# Patient Record
Sex: FEMALE | Race: BLACK OR AFRICAN AMERICAN | NOT HISPANIC OR LATINO | Employment: FULL TIME | URBAN - METROPOLITAN AREA
[De-identification: names, ages, dates, MRNs, and addresses within clinical notes are randomized per-mention and may not be internally consistent; named-entity substitution may affect disease eponyms.]

---

## 2019-02-12 ENCOUNTER — HISTORICAL (OUTPATIENT)
Dept: RADIOLOGY | Facility: HOSPITAL | Age: 39
End: 2019-02-12

## 2020-02-28 ENCOUNTER — HISTORICAL (OUTPATIENT)
Dept: ADMINISTRATIVE | Facility: HOSPITAL | Age: 40
End: 2020-02-28

## 2020-02-28 LAB
ABS NEUT (OLG): 3.87 X10(3)/MCL (ref 2.1–9.2)
BASOPHILS # BLD AUTO: 0 X10(3)/MCL (ref 0–0.2)
BASOPHILS NFR BLD AUTO: 0 %
BUN SERPL-MCNC: 11 MG/DL (ref 7–18)
CALCIUM SERPL-MCNC: 9.1 MG/DL (ref 8.5–10.1)
CHLORIDE SERPL-SCNC: 108 MMOL/L (ref 98–107)
CO2 SERPL-SCNC: 28 MMOL/L (ref 21–32)
CREAT SERPL-MCNC: 0.6 MG/DL (ref 0.6–1.3)
CREAT/UREA NIT SERPL: 18
EOSINOPHIL # BLD AUTO: 0.1 X10(3)/MCL (ref 0–0.9)
EOSINOPHIL NFR BLD AUTO: 1 %
ERYTHROCYTE [DISTWIDTH] IN BLOOD BY AUTOMATED COUNT: 15.6 % (ref 11.5–14.5)
GLUCOSE SERPL-MCNC: 96 MG/DL (ref 74–106)
HCT VFR BLD AUTO: 36.1 % (ref 35–46)
HGB BLD-MCNC: 10.9 GM/DL (ref 12–16)
IMM GRANULOCYTES # BLD AUTO: 0.02 10*3/UL
IMM GRANULOCYTES NFR BLD AUTO: 0 %
LYMPHOCYTES # BLD AUTO: 1.9 X10(3)/MCL (ref 0.6–4.6)
LYMPHOCYTES NFR BLD AUTO: 30 %
MAGNESIUM SERPL-MCNC: 2.1 MG/DL (ref 1.6–2.6)
MCH RBC QN AUTO: 28.4 PG (ref 26–34)
MCHC RBC AUTO-ENTMCNC: 30.2 GM/DL (ref 31–37)
MCV RBC AUTO: 94 FL (ref 80–100)
MONOCYTES # BLD AUTO: 0.5 X10(3)/MCL (ref 0.1–1.3)
MONOCYTES NFR BLD AUTO: 7 %
NEUTROPHILS # BLD AUTO: 3.87 X10(3)/MCL (ref 2.1–9.2)
NEUTROPHILS NFR BLD AUTO: 61 %
PLATELET # BLD AUTO: 311 X10(3)/MCL (ref 130–400)
PMV BLD AUTO: 9.1 FL (ref 7.4–10.4)
POTASSIUM SERPL-SCNC: 4.1 MMOL/L (ref 3.5–5.1)
RBC # BLD AUTO: 3.84 X10(6)/MCL (ref 4–5.2)
SODIUM SERPL-SCNC: 140 MMOL/L (ref 136–145)
WBC # SPEC AUTO: 6.4 X10(3)/MCL (ref 4.5–11)

## 2020-04-10 ENCOUNTER — HISTORICAL (OUTPATIENT)
Dept: ADMINISTRATIVE | Facility: HOSPITAL | Age: 40
End: 2020-04-10

## 2021-10-26 ENCOUNTER — HISTORICAL (OUTPATIENT)
Dept: ADMINISTRATIVE | Facility: HOSPITAL | Age: 41
End: 2021-10-26

## 2021-10-26 LAB
ABS NEUT (OLG): 5.32 X10(3)/MCL (ref 2.1–9.2)
ALBUMIN SERPL-MCNC: 4.1 GM/DL (ref 3.5–5)
ALBUMIN/GLOB SERPL: 1.1 RATIO (ref 1.1–2)
ALP SERPL-CCNC: 67 UNIT/L (ref 40–150)
ALT SERPL-CCNC: 21 UNIT/L (ref 0–55)
AST SERPL-CCNC: 22 UNIT/L (ref 5–34)
BASOPHILS # BLD AUTO: 0 X10(3)/MCL (ref 0–0.2)
BASOPHILS NFR BLD AUTO: 0 %
BILIRUB SERPL-MCNC: 0.6 MG/DL
BILIRUBIN DIRECT+TOT PNL SERPL-MCNC: 0.2 MG/DL (ref 0–0.5)
BILIRUBIN DIRECT+TOT PNL SERPL-MCNC: 0.4 MG/DL (ref 0–0.8)
BUN SERPL-MCNC: 13.2 MG/DL (ref 7–18.7)
CALCIUM SERPL-MCNC: 9.7 MG/DL (ref 8.7–10.5)
CHLORIDE SERPL-SCNC: 106 MMOL/L (ref 98–107)
CHOLEST SERPL-MCNC: 223 MG/DL
CHOLEST/HDLC SERPL: 5 {RATIO} (ref 0–5)
CO2 SERPL-SCNC: 25 MMOL/L (ref 22–29)
CREAT SERPL-MCNC: 0.72 MG/DL (ref 0.55–1.02)
DEPRECATED CALCIDIOL+CALCIFEROL SERPL-MC: 9 NG/ML (ref 30–80)
EOSINOPHIL # BLD AUTO: 0 X10(3)/MCL (ref 0–0.9)
EOSINOPHIL NFR BLD AUTO: 1 %
ERYTHROCYTE [DISTWIDTH] IN BLOOD BY AUTOMATED COUNT: 16 % (ref 11.5–14.5)
EST. AVERAGE GLUCOSE BLD GHB EST-MCNC: 99.7 MG/DL
FERRITIN SERPL-MCNC: 10.21 NG/ML (ref 4.63–204)
GLOBULIN SER-MCNC: 3.9 GM/DL (ref 2.4–3.5)
GLUCOSE SERPL-MCNC: 86 MG/DL (ref 74–100)
HAV IGM SERPL QL IA: NONREACTIVE
HBA1C MFR BLD: 5.1 %
HBV CORE IGM SERPL QL IA: NONREACTIVE
HBV SURFACE AG SERPL QL IA: NONREACTIVE
HCT VFR BLD AUTO: 36.4 % (ref 35–46)
HCV AB SERPL QL IA: NONREACTIVE
HDLC SERPL-MCNC: 48 MG/DL (ref 35–60)
HGB BLD-MCNC: 11.2 GM/DL (ref 12–16)
HIV 1+2 AB+HIV1 P24 AG SERPL QL IA: NONREACTIVE
IMM GRANULOCYTES # BLD AUTO: 0.01 10*3/UL
IMM GRANULOCYTES NFR BLD AUTO: 0 %
IRON SATN MFR SERPL: 16 % (ref 20–50)
IRON SERPL-MCNC: 66 UG/DL (ref 50–170)
LDLC SERPL CALC-MCNC: 151 MG/DL (ref 50–140)
LYMPHOCYTES # BLD AUTO: 2.1 X10(3)/MCL (ref 0.6–4.6)
LYMPHOCYTES NFR BLD AUTO: 26 %
MCH RBC QN AUTO: 27.2 PG (ref 26–34)
MCHC RBC AUTO-ENTMCNC: 30.8 GM/DL (ref 31–37)
MCV RBC AUTO: 88.3 FL (ref 80–100)
MONOCYTES # BLD AUTO: 0.6 X10(3)/MCL (ref 0.1–1.3)
MONOCYTES NFR BLD AUTO: 7 %
NEUTROPHILS # BLD AUTO: 5.32 X10(3)/MCL (ref 2.1–9.2)
NEUTROPHILS NFR BLD AUTO: 66 %
NRBC BLD AUTO-RTO: 0 % (ref 0–0.2)
PLATELET # BLD AUTO: 343 X10(3)/MCL (ref 130–400)
PMV BLD AUTO: 9 FL (ref 7.4–10.4)
POTASSIUM SERPL-SCNC: 4 MMOL/L (ref 3.5–5.1)
PROT SERPL-MCNC: 8 GM/DL (ref 6.4–8.3)
RBC # BLD AUTO: 4.12 X10(6)/MCL (ref 4–5.2)
SODIUM SERPL-SCNC: 137 MMOL/L (ref 136–145)
TIBC SERPL-MCNC: 343 UG/DL (ref 70–310)
TIBC SERPL-MCNC: 409 UG/DL (ref 250–450)
TRANSFERRIN SERPL-MCNC: 378 MG/DL (ref 180–382)
TRIGL SERPL-MCNC: 118 MG/DL (ref 37–140)
TSH SERPL-ACNC: 1.29 UIU/ML (ref 0.35–4.94)
VLDLC SERPL CALC-MCNC: 24 MG/DL
WBC # SPEC AUTO: 8.1 X10(3)/MCL (ref 4.5–11)

## 2022-04-11 ENCOUNTER — HISTORICAL (OUTPATIENT)
Dept: ADMINISTRATIVE | Facility: HOSPITAL | Age: 42
End: 2022-04-11

## 2022-04-24 VITALS
OXYGEN SATURATION: 99 % | DIASTOLIC BLOOD PRESSURE: 87 MMHG | BODY MASS INDEX: 37.43 KG/M2 | HEIGHT: 65 IN | WEIGHT: 224.63 LBS | SYSTOLIC BLOOD PRESSURE: 131 MMHG

## 2022-05-04 NOTE — HISTORICAL [facility]ER
This is a historical note converted from [employer]. Formatting and pictures may have been removed.  Please reference [employer] for original formatting and attached multimedia. Chief Complaint  right lower leg swelling, redness, pain (rated 3/10 now; pain increased ?wehn walking) X 3 days  History of Present Illness  40-year-old female presents during COVID 19?health emergency?complaining?of 3 days of right lateral lower leg pain, no precipitating event, no fever or chills.? Denies chest pain or shortness of breath  Review of Systems  Constitutional: negative except as stated in HPI  Eye: negative except as stated in HPI  ENT: negative except as stated in HPI  Respiratory: negative except as stated in HPI  Cardiovascular: negative except as stated in HPI  Gastrointestinal: negative except as stated in HPI  Genitourinary: negative except as stated in HPI  Physical Exam  Vitals & Measurements  T:?37.2? ?C (Oral)? HR:?79(Peripheral)? RR:?18? BP:?126/85? SpO2:?100%?  HT:?164?cm? WT:?101.4?kg? LMP:?04/02/2020 00:00 CDT?  VITAL SIGNS: ?Reviewed. ? ?  GENERAL: In no apparent distress  HEAD:? No signs of head trauma?  MUSCULOSKELETAL: Right lower leg-there is mild?tenderness?along the?distal lateral lower leg,?just above the lateral malleolus.? Knee, ankle, and foot are within normal limits. ?There is no lower extremity?edema, no pitting. ?The skin is mildly tender?with?faint erythema.  NEUROLOGIC EXAM: Alert and oriented x 3.? No focal sensory or strength deficits.?? Speech normal.? Follows commands  ?  ?  ?(04/10/2020 10:02 CDT XR Tibia-Fibula Right 2 Views)  ?  Reason For Exam  Pain  ?  Radiology Report  Clinical History:  Pain  ?  Technique:  2 views of the right tibia and fibula  ?  Comparison:  No prior imaging available for comparison.  ?  Findings:  Degenerative changes with osteophytes of the lateral greater than  medial malleolus. No displaced fracture is identified. The soft  tissues are grossly  unremarkable.  ?  Impression:  No acute osseous abnormality, fracture, or dislocation.  ?  Moderate degenerative changes are noted.  ?  ?  ?  ?  Signature Line  Electronically Signed By: Julian RAHMAN, Kennedy Espinosa  Date/Time Signed: 04/10/2020 10:07 [1]  Assessment/Plan  1.?Cellulitis?L03.90  Orders:  clindamycin, 300 mg = 1 cap(s), Oral, q6hr, X 10 day(s), # 40 cap(s), 0 Refill(s), Pharmacy: [id number], 164, cm, Height/Length Dosing, 04/10/20 9:25:00 CDT, 101.4, kg, Weight Dosing, 04/10/20 9:25:00 CDT  Reviewed x-ray films. ?Will treat as cellulitis?at this point. ?Clindamycin, warm compresses. ?Monitor condition closely?and return to urgent care if not improving in 2 to 3 days or any worsening.? COVID 19 precautions discussed   Problem List/Past Medical History  Ongoing  Conductive hearing loss  Left otitis externa  Obesity  Tympanic membrane perforation  Historical  Leg swelling symptom  none  Procedure/Surgical History   Section (None) (2014)  Low cervical  section (2014)  Other bilateral ligation and division of fallopian tubes (2014)   (10/10/2010)   Medications  clindamycin 300 mg oral capsule, 300 mg= 1 cap(s), Oral, q6hr  Allergies  No Known Allergies  No Known Medication Allergies  Social History  Abuse/Neglect  No, 04/10/2020  Alcohol - Denies Alcohol Use, 2013  Current, 1-2 times per year, 2020  Employment/School  Employed, 2020  Exercise  Exercise frequency: 3-4 times/week. Exercise type: Walking., 2020  Home/Environment  Lives with Children. Living situation: Home/Independent., 2020  Nutrition/Health  Regular, Good, 2020  Sexual  Sexual orientation: Choose not to disclose. Gender Identity Identifies as female., 2019  Substance Use - Denies Substance Abuse, 2013  Never, 2020  Tobacco - Denies Tobacco Use, 2013  Former smoker, quit more than 30 days ago, No, 04/10/2020  Family History  Asthma:  Mother.  Father: History is negative  Brother: History is negative  Sister: History is negative  Immunizations  Vaccine Date Status Comments   influenza virus vaccine, inactivated - Not Given Patient Refuses     tetanus/diphtheria/pertussis, acel(Tdap) 02/14/2014 Given Nursing Judgment   Health Maintenance  Health Maintenance  ???Pending?(in the next year)  ??? ??OverDue  ??? ? ? ?Diabetes Screening due??and every?  ??? ? ? ?Depression Screening due??12/10/19??and every 1??year(s)  ??? ? ? ?Alcohol Misuse Screening due??01/01/20??and every 1??year(s)  ??? ??Due?  ??? ? ? ?Cervical Cancer Screening due??04/10/20??and every?  ??? ??Due In Future?  ??? ? ? ?Obesity Screening not due until??01/01/21??and every 1??year(s)  ??? ? ? ?ADL Screening not due until??02/28/21??and every 1??year(s)  ???Satisfied?(in the past 1 year)  ??? ??Satisfied?  ??? ? ? ?ADL Screening on??02/28/20.??Satisfied by Ryanne Rushing LPN  ??? ? ? ?Blood Pressure Screening on??04/10/20.??Satisfied by Gordon Goldstein LPN  ??? ? ? ?Body Mass Index Check on??02/28/20.??Satisfied by Ryanne Rushing LPN  ??? ? ? ?Diabetes Screening on??02/28/20.??Satisfied by Steff Ramos  ??? ? ? ?Obesity Screening on??02/28/20.??Satisfied by Ryanne Rushing LPN  ?     [1]?XR Tibia-Fibula Right 2 Views; Julian RAHMAN, Kennedy Espinosa 04/10/2020 10:02 CDT

## 2023-07-22 ENCOUNTER — HOSPITAL ENCOUNTER (EMERGENCY)
Facility: HOSPITAL | Age: 43
Discharge: HOME | End: 2023-07-22
Attending: EMERGENCY MEDICINE

## 2023-07-22 VITALS
SYSTOLIC BLOOD PRESSURE: 133 MMHG | TEMPERATURE: 98 F | HEIGHT: 65 IN | RESPIRATION RATE: 18 BRPM | HEART RATE: 70 BPM | BODY MASS INDEX: 37.1 KG/M2 | WEIGHT: 222.69 LBS | DIASTOLIC BLOOD PRESSURE: 84 MMHG | OXYGEN SATURATION: 98 %

## 2023-07-22 DIAGNOSIS — M79.605 LEFT LEG PAIN: ICD-10-CM

## 2023-07-22 DIAGNOSIS — M25.462 EFFUSION OF LEFT KNEE: Primary | ICD-10-CM

## 2023-07-22 DIAGNOSIS — M25.562 LEFT KNEE PAIN: ICD-10-CM

## 2023-07-22 DIAGNOSIS — M25.762 OSTEOPHYTE, LEFT KNEE: ICD-10-CM

## 2023-07-22 LAB
B-HCG UR QL: NEGATIVE
CTP QC/QA: YES

## 2023-07-22 PROCEDURE — 96372 THER/PROPH/DIAG INJ SC/IM: CPT | Performed by: PHYSICIAN ASSISTANT

## 2023-07-22 PROCEDURE — 63600175 PHARM REV CODE 636 W HCPCS: Performed by: PHYSICIAN ASSISTANT

## 2023-07-22 PROCEDURE — 99284 EMERGENCY DEPT VISIT MOD MDM: CPT

## 2023-07-22 PROCEDURE — 81025 URINE PREGNANCY TEST: CPT | Performed by: PHYSICIAN ASSISTANT

## 2023-07-22 RX ORDER — DICLOFENAC SODIUM 10 MG/G
2 GEL TOPICAL 2 TIMES DAILY PRN
Qty: 200 G | Refills: 0 | Status: SHIPPED | OUTPATIENT
Start: 2023-07-22

## 2023-07-22 RX ORDER — KETOROLAC TROMETHAMINE 30 MG/ML
30 INJECTION, SOLUTION INTRAMUSCULAR; INTRAVENOUS
Status: COMPLETED | OUTPATIENT
Start: 2023-07-22 | End: 2023-07-22

## 2023-07-22 RX ORDER — PREDNISONE 20 MG/1
20 TABLET ORAL DAILY
Qty: 4 TABLET | Refills: 0 | Status: SHIPPED | OUTPATIENT
Start: 2023-07-22 | End: 2023-07-26

## 2023-07-22 RX ORDER — DICLOFENAC SODIUM 75 MG/1
75 TABLET, DELAYED RELEASE ORAL 2 TIMES DAILY PRN
Qty: 30 TABLET | Refills: 0 | Status: SHIPPED | OUTPATIENT
Start: 2023-07-22

## 2023-07-22 RX ORDER — PREDNISONE 10 MG/1
40 TABLET ORAL
Status: COMPLETED | OUTPATIENT
Start: 2023-07-22 | End: 2023-07-22

## 2023-07-22 RX ORDER — METHOCARBAMOL 500 MG/1
500 TABLET, FILM COATED ORAL 3 TIMES DAILY PRN
Qty: 15 TABLET | Refills: 0 | Status: SHIPPED | OUTPATIENT
Start: 2023-07-22 | End: 2023-07-27

## 2023-07-22 RX ADMIN — KETOROLAC TROMETHAMINE 30 MG: 30 INJECTION, SOLUTION INTRAMUSCULAR; INTRAVENOUS at 09:07

## 2023-07-22 RX ADMIN — PREDNISONE 40 MG: 10 TABLET ORAL at 09:07

## 2023-07-22 NOTE — DISCHARGE INSTRUCTIONS
Take medications as prescribed as needed for pain with food and plenty of water.  Alternate heat and ice.   Apply heating pad to sore muscles ( being careful not to burn skin).  Soak in warm epsom salt baths for 20-30 minutes daily to help with sore and aching muscles.  Avoid lifting heavy.   Follow up with the primary care provider within 2-3 days.    Referral sent to orthopedic clinic for follow-up.  They will call you to schedule an appointment.    Alternate diclofenac gel and tablets.  Do not take at the same time.

## 2023-07-22 NOTE — ED NOTES
Pt  given discharge instructions. Instructed to follow up with pcp and return to er if any complications. Pt stated understanding.

## 2023-07-22 NOTE — ED PROVIDER NOTES
Encounter Date: 7/22/2023       History     Chief Complaint   Patient presents with    Knee Pain     C/o left knee pain. States fell down stairs x 1 week ago. Having problems to bend knee      43 year old female presents to the emergency department with complaints of left knee and lower leg pain after falling down some stairs last Friday.   She states she has been taking Ibuprofen for the pain however she's had no improvement and is having trouble bending her knee.  She rates her pain 7/10.  Ambulates without assistance.  She denies shortness of breath, fever, chills.      The history is provided by the patient. No  was used.   Review of patient's allergies indicates:  No Known Allergies  History reviewed. No pertinent past medical history.  History reviewed. No pertinent surgical history.  History reviewed. No pertinent family history.  Social History     Tobacco Use    Smoking status: Never    Smokeless tobacco: Never   Substance Use Topics    Alcohol use: Yes     Comment: occ    Drug use: Never     Review of Systems   Constitutional:  Negative for chills and fever.   Eyes: Negative.    Respiratory:  Negative for cough, chest tightness and shortness of breath.    Cardiovascular:  Negative for chest pain, palpitations and leg swelling.   Gastrointestinal:  Negative for abdominal pain, diarrhea, nausea and vomiting.   Musculoskeletal:  Negative for back pain and neck pain.        Left knee pain radiating into lower leg   Skin:  Negative for color change, rash and wound.   Neurological:  Negative for dizziness, weakness, light-headedness and headaches.     Physical Exam     Initial Vitals [07/22/23 0927]   BP Pulse Resp Temp SpO2   136/88 69 20 97.7 °F (36.5 °C) 100 %      MAP       --         Physical Exam    Nursing note and vitals reviewed.  Constitutional: She appears well-developed and well-nourished.   HENT:   Head: Normocephalic and atraumatic.   Nose: Nose normal.   Mouth/Throat:  Oropharynx is clear and moist.   Eyes: Conjunctivae are normal.   Neck: Neck supple.   Normal range of motion.  Cardiovascular:  Normal rate, regular rhythm, normal heart sounds and intact distal pulses.           Pulmonary/Chest: Breath sounds normal.   Abdominal: Abdomen is soft. Bowel sounds are normal. There is no abdominal tenderness. There is no rebound and no guarding.   Musculoskeletal:         General: Normal range of motion.      Cervical back: Normal range of motion and neck supple.      Comments: Left knee pain, mostly with flexion of knee.  No erythema, minimal swelling     Neurological: She is alert.   Skin: Skin is warm. Capillary refill takes less than 2 seconds.       ED Course   Procedures  Labs Reviewed   POCT URINE PREGNANCY          Imaging Results              X-Ray Tibia Fibula 2 View Left (Final result)  Result time 07/22/23 10:41:54      Final result by Latanya Tsai MD (07/22/23 10:41:54)                   Impression:      No acute bony abnormality.      Electronically signed by: Latanya Tsai  Date:    07/22/2023  Time:    10:41               Narrative:    EXAMINATION:  XR TIBIA FIBULA 2 VIEW LEFT    CLINICAL HISTORY:  Pain in left leg    COMPARISON:  None.    FINDINGS:  There is no acute fracture or malalignment.  The soft tissues are unremarkable.                                       X-Ray Knee 3 View Left (Final result)  Result time 07/22/23 10:41:20      Final result by Latanya Tsai MD (07/22/23 10:41:20)                   Impression:      1. No acute bony abnormality.  2. Small knee joint effusion.      Electronically signed by: Latanya Tsai  Date:    07/22/2023  Time:    10:41               Narrative:    EXAMINATION:  XR KNEE 3 VIEW LEFT    CLINICAL HISTORY:  Pain in left knee    COMPARISON:  None.    FINDINGS:  There is no acute fracture or malalignment.  There are small tricompartmental osteophytes.  A small knee joint effusion is noted.                                        Medications   ketorolac injection 30 mg (30 mg Intramuscular Given 7/22/23 0954)   predniSONE tablet 40 mg (40 mg Oral Given 7/22/23 0954)     Medical Decision Making:   Initial Assessment:   43 year old female presents to the emergency department with complaints of left knee and lower leg pain after falling down some stairs last Friday.   She states she has been taking Ibuprofen for the pain however she's had no improvement and is having trouble bending her knee.   Differential Diagnosis:   Fracture  Joint effusion  Contusion  Arthritis  Knee sprain  Clinical Tests:   Radiological Study: Ordered and Reviewed  ED Management:  Toradol 30 mg IM and prednisone 40 mg tablet given in the ED for pain and inflammation    Xr left tib tibula:  No acute bony abnormality    XR left knee: There is no acute fracture or malalignment.  There are small tricompartmental osteophytes.  A small knee joint effusion is noted.    Referral sent to orthopedic clinic for follow-up    Prescribed topical and oral NSAIDs, oral steroids and muscle relaxers.  The patient is resting comfortably and in no acute distress.  She states that her symptoms have improved after treatment in Emergency Department. I personally discussed her test results and treatment plan.  Gave strict ED precautions, discussed specific conditions for return to the emergency department and importance of follow up with her primary care provider and orthopedic clinic.  Patient voices understanding and agrees to the plan discussed. All patients' questions have been answered at this time.   She has remained hemodynamically stable throughout entire stay in ED and is stable for discharge home.             ED Course as of 07/22/23 1108   Sat Jul 22, 2023   1046 X-Ray Tibia Fibula 2 View Left  No acute bony abnormality. [ER]   1046 X-Ray Knee 3 View Left  There is no acute fracture or malalignment.  There are small tricompartmental osteophytes.  A small knee joint  effusion is noted. [ER]      ED Course User Index  [ER] FORD Eli                 Clinical Impression:   Final diagnoses:  [M25.562] Left knee pain  [M79.605] Left leg pain  [M25.462] Effusion of left knee (Primary)  [M25.762] Osteophyte, left knee        ED Disposition Condition    Discharge Stable          ED Prescriptions       Medication Sig Dispense Start Date End Date Auth. Provider    diclofenac sodium (VOLTAREN) 1 % Gel Apply 2 g topically 2 (two) times daily as needed (pain). For lower extremity joints, apply 4 g on joint up to 4x a day.  For upper extremity joints, apply 2 g on joint up to 4x a day.  Do not use this at the same time as taking an oral NSAID such as Diclofenac, Ibuprofen, Mobic, Advil, Aleve, Naproxen etc.  However you can alternate this topical NSAID with oral NSAID.   You can take Tylenol while using this medication. 200 g 7/22/2023 -- FORD Eli    diclofenac (VOLTAREN) 75 MG EC tablet Take 1 tablet (75 mg total) by mouth 2 (two) times daily as needed (pain). Do not take this with Advil, Ibuprofen, Motrin, Asprin, or Toradol. 30 tablet 7/22/2023 -- FORD Eli    predniSONE (DELTASONE) 20 MG tablet Take 1 tablet (20 mg total) by mouth once daily. for 4 days 4 tablet 7/22/2023 7/26/2023 FORD Eli    methocarbamoL (ROBAXIN) 500 MG Tab Take 1 tablet (500 mg total) by mouth 3 (three) times daily as needed (muscle spasm). For muscle spasm 15 tablet 7/22/2023 7/27/2023 FORD Eli          Follow-up Information       Follow up With Specialties Details Why Contact Info    [facility] Emergency Medicine  As needed, If symptoms worsen [address] [place] [id number] [phone]    [facility]  They will call you to schedule an apt. [address] [facility] [id number] [phone]             FORD Eli  07/22/23 5044

## 2023-07-22 NOTE — Clinical Note
"Brittney"Brittany Kerr was seen and treated in our emergency department on 7/22/2023.  She may return to work on 07/25/2023.       If you have any questions or concerns, please don't hesitate to call.      delaney lawson RN    "

## 2023-08-31 ENCOUNTER — OFFICE VISIT (OUTPATIENT)
Dept: ORTHOPEDICS | Facility: CLINIC | Age: 43
End: 2023-08-31

## 2023-08-31 ENCOUNTER — HOSPITAL ENCOUNTER (OUTPATIENT)
Dept: RADIOLOGY | Facility: HOSPITAL | Age: 43
Discharge: HOME | End: 2023-08-31
Attending: STUDENT IN AN ORGANIZED HEALTH CARE EDUCATION/TRAINING PROGRAM

## 2023-08-31 VITALS
DIASTOLIC BLOOD PRESSURE: 90 MMHG | WEIGHT: 222 LBS | HEIGHT: 65 IN | SYSTOLIC BLOOD PRESSURE: 143 MMHG | HEART RATE: 69 BPM | BODY MASS INDEX: 36.99 KG/M2

## 2023-08-31 DIAGNOSIS — M25.562 CHRONIC PAIN OF LEFT KNEE: ICD-10-CM

## 2023-08-31 DIAGNOSIS — M25.462 EFFUSION OF LEFT KNEE: Primary | ICD-10-CM

## 2023-08-31 DIAGNOSIS — G89.29 CHRONIC PAIN OF LEFT KNEE: ICD-10-CM

## 2023-08-31 DIAGNOSIS — M23.92 INTERNAL DERANGEMENT OF LEFT KNEE: ICD-10-CM

## 2023-08-31 DIAGNOSIS — M25.562 LEFT KNEE PAIN, UNSPECIFIED CHRONICITY: ICD-10-CM

## 2023-08-31 PROCEDURE — 73564 X-RAY EXAM KNEE 4 OR MORE: CPT | Mod: TC,LT

## 2023-08-31 PROCEDURE — 99214 OFFICE O/P EST MOD 30 MIN: CPT | Mod: PBBFAC | Performed by: STUDENT IN AN ORGANIZED HEALTH CARE EDUCATION/TRAINING PROGRAM

## 2023-08-31 RX ORDER — PREDNISONE 50 MG/1
50 TABLET ORAL DAILY
Qty: 5 TABLET | Refills: 0 | Status: SHIPPED | OUTPATIENT
Start: 2023-08-31

## 2023-08-31 NOTE — PROGRESS NOTES
"Subjective:    Patient ID: Brittney Kerr is a 43 y.o. female  who presented to [facility] for new visit..      Chief Complaint: Pain of the Left Knee      History of Present Illness:  HPI    Brittney Kerr who has no formally previously diagnosed musculoskeletal condition presented today with with knee pain involving the left knee for the past 1 year. Pain is located suprapatellar and lanteral and medial patellofemoral joint space. nciting event: injured during a fall while going down stairs  around 7/15  . Pain is aggravated by  bending the knee .  Patient has had prior knee problems. Evaluation to date: plain films and ER evaluation. Treatment to date: avoidance of activity, topical analgesics, oral analgesics, and home exercises. Expectations for today's visit includes evaluation and pain relief.  Occupation includes cook. .    Knee Review of Systems:  Swelling?  yes  Instability?  no  Mechanical sx?  no  <30 min AM stiffness? no  Limited ROM? yes  Fever/Chills? no    Current Choice of Exercise:  none    ROS       Objective:      Physical Exam:    BP (!) 143/90   Pulse 69   Ht 5' 5" (1.651 m)   Wt 100.7 kg (222 lb)   BMI 36.94 kg/m²     Ortho/SPM Exam    Appearance:  antalgic  FWB  Alignment: Left: normal Right: normal   Soft tissue swelling: Left: no Right: no  Effusion: Left:  Positive Right: Negative  Erythema: Left no Right: no  Ecchymosis: Left: no Right: no  Atrophy: Left: no Right: no    Palpation:  Knee Tenderness: Left: Medial joint line, Lateral joint line, and anterior knee joint Right: None    Range of motion:  Flexion (140): Left:  90 Right: 130  Extension (0): Left: 10 Right: 0    Strength:  . At least 4/5 flexion and extension.        Special Tests:  Ballotable Effusion:Left: Negative Right: Negative   Fluid Wave: Left: Positive Right: Negative   Crepitus: Left: Negative Right: Negative   Patellar grind test: Left: Not performed  Right: " Not performed    Lachman: Left: Negative Right: Negative   Ant Drawer: Left: Negative Right: Negative   Posterior Drawer: Left: Negative Right: Negative       General appearance: NAD  Peripheral pulses: normal bilaterally   Reflexes: Left: normal Right normal   Sensation: normal    Labs:  Last A1c: 5.1     Imaging:   Previous images reviewed.  X-rays ordered and performed today: yes  # of views: 4 Laterality: left  My Interpretation:   persistent effusion. No appreciable fractures. KL grade 2 knee oa     Assessment:        Encounter Diagnoses   Code Name Primary?    M25.462 Effusion of left knee Yes    M25.562, G89.29 Chronic pain of left knee     M23.92 Internal derangement of left knee         Plan:           Orders Placed This Encounter   Procedures    X-Ray Knee Complete 4 or More Views Left     Standing Status:   Future     Number of Occurrences:   1     Standing Expiration Date:   8/31/2024     Order Specific Question:   May the Radiologist modify the order per protocol to meet the clinical needs of the patient?     Answer:   Yes     Order Specific Question:   Release to patient     Answer:   Immediate     Medications Ordered This Encounter   Medications    predniSONE (DELTASONE) 50 MG Tab     Sig: Take 1 tablet (50 mg total) by mouth once daily.     Dispense:  5 tablet     Refill:  0       MDM: Prior external referring provider notes reviewed. Prior external referring provider studies reviewed.   Dx: left Knee traumatic pain with unilateral effusion. Suspect internal derangement vs occult fracture.  Treatment Plan: Discussed with patient diagnosis, prognosis, and treatment recommendations. Education provided.    topical hot or cold therapy  Over the counter NSAID and/or tylenol provided you do not have contraindications such as but not limited to liver or kidney disease or uncontrolled blood pressure. If you're doctors have told you to to not take them based on your health, do not take them.   Using a brace  provided to you here or from your local pharmacy or durable medical equipment (DME) store.   ACE wrap provided. Ordering MRI.  Imaging: radiological studies ordered and independently reviewed; discussed with patient; pending radiologist interpretation.   Weight Management: is paramount. BMI >35; recommend to discuss with PCP about bariatric surgery options if applicable. A bmi 24.9 or less may provide further relief..   Procedure: Discussed CSI/VSI as treatment options; discussed CSI vs VS injections as treatment options in future if conservative measures do not improve symptoms.  Activity: Activity as tolerated; HEP to include aerobic conditioning and strength training with non-painful activity. ROM/STG exercises. Proper footware; assistive devises to avoid limping.   Therapy: No formal therapy  Medication: first line treatment with daily acetaminophen. Up to 1000 mg three times daily can be taken; medication precautions given. and start prednisone; medication precautions given. Please see your primary care physician for further refills.  RTC: after imaging test complete.         Procedures